# Patient Record
Sex: MALE | Race: WHITE | URBAN - METROPOLITAN AREA
[De-identification: names, ages, dates, MRNs, and addresses within clinical notes are randomized per-mention and may not be internally consistent; named-entity substitution may affect disease eponyms.]

---

## 2019-04-08 ENCOUNTER — IMPORTED ENCOUNTER (OUTPATIENT)
Dept: URBAN - METROPOLITAN AREA CLINIC 31 | Facility: CLINIC | Age: 84
End: 2019-04-08

## 2019-04-08 PROBLEM — H04.212: Noted: 2019-04-08

## 2019-04-08 PROBLEM — Z96.1: Noted: 2019-04-08

## 2019-04-08 PROCEDURE — 68840 EXPLORE/IRRIGATE TEAR DUCTS: CPT

## 2019-04-08 PROCEDURE — 99203 OFFICE O/P NEW LOW 30 MIN: CPT

## 2019-04-08 NOTE — PATIENT DISCUSSION
Return for an appointment in 1 week with Dr. Mihaela Pate at [facility]. Kenyetta Doll for [employer].

## 2019-04-08 NOTE — PATIENT DISCUSSION
1.  1.  Epiphora OS - The patient is complaining of excess lacrimation of the left eye. NASOLACRIMAL IRRIGATION - PATIENT. consider lateral cantal strip surgery due to lax lower lid letf side. Because it is relatively mild and not constant no diagnostic or therapeutic intervention was recommended at this time. 2. Probing Of Lacrimal Canaliculi Lower Lid3. Pseudophakia OU - IOLs stable. Monitor. 2. Return for an appointment in 1 week with Dr. Nguyen Askew at [facility]. Jackson ramirez [employer].

## 2019-04-22 ENCOUNTER — IMPORTED ENCOUNTER (OUTPATIENT)
Dept: URBAN - METROPOLITAN AREA CLINIC 31 | Facility: CLINIC | Age: 84
End: 2019-04-22

## 2019-04-22 PROBLEM — Z96.1: Noted: 2019-04-22

## 2019-04-22 PROBLEM — H04.212: Noted: 2019-04-22

## 2019-04-22 PROCEDURE — 99213 OFFICE O/P EST LOW 20 MIN: CPT

## 2019-04-22 NOTE — PATIENT DISCUSSION
1.  1.  Epiphora OS - The patient is complaining of excess lacrimation of the left eye. NASOLACRIMAL IRRIGATION - PATIENT. consider lateral cantal strip surgery due to lax lower lid letf side. Because it is relatively mild and not constant no diagnostic or therapeutic intervention was recommended at this time. 23.  Pseudophakia OU - IOLs stable. Monitor. 2. Return for an appointment in 1 week [place] Alden Sp[place] [facility] Arben Domínguez for [employer].

## 2019-04-22 NOTE — PATIENT DISCUSSION
Epiphora left>right - resolved. Increased lower eyelid laxityReturn for an appointment in 1 year for comprehensive exam. with Dr. Deirdre Santamaria.

## 2019-06-20 ENCOUNTER — IMPORTED ENCOUNTER (OUTPATIENT)
Dept: URBAN - METROPOLITAN AREA CLINIC 31 | Facility: CLINIC | Age: 84
End: 2019-06-20

## 2019-06-20 PROBLEM — H26.492: Noted: 2019-06-20

## 2019-06-20 PROBLEM — Z96.1: Noted: 2019-06-20

## 2019-06-20 PROCEDURE — 99080 SPECIAL REPORTS OR FORMS: CPT

## 2019-06-20 PROCEDURE — 92015 DETERMINE REFRACTIVE STATE: CPT

## 2019-06-20 PROCEDURE — 92014 COMPRE OPH EXAM EST PT 1/>: CPT

## 2019-06-20 NOTE — PATIENT DISCUSSION
PCO  OS (Posterior Capsule Opacification)   PCO is visually significant and impairment of vision does not meet the patient’s functional needs or interferes with activities of daily living. Risks benefits and alternatives to the Nd:YAG Laser reviewed including elevated IOP immediately postop and retinal tear/detachment. Patient to notify their ophthalmologist promptly if they have a significant change in symptoms such as flashes of light (photopsia) an increase in floaters loss of visual field or decrease in visual acuity after the procedure. Patient will be scheduled in [place] [id number] for Nd:YAG Laser.

## 2019-06-20 NOTE — PATIENT DISCUSSION
1.  Pseudophakia OU - IOLs stable. Monitor. 2. PCO  OS (Posterior Capsule Opacification)   PCO is visually significant and impairment of vision does not meet the patient’s functional needs or interferes with activities of daily living. 6 mos caps eval with Dr. Rosendo Hernnadez.

## 2019-11-01 ENCOUNTER — IMPORTED ENCOUNTER (OUTPATIENT)
Dept: URBAN - METROPOLITAN AREA CLINIC 31 | Facility: CLINIC | Age: 84
End: 2019-11-01

## 2019-11-01 PROBLEM — H26.491: Noted: 2019-11-01

## 2019-11-01 PROBLEM — H26.492: Noted: 2019-11-01

## 2019-11-01 PROBLEM — Z96.1: Noted: 2019-11-01

## 2019-11-01 PROBLEM — H26.493: Noted: 2019-11-01

## 2019-11-01 PROCEDURE — 99213 OFFICE O/P EST LOW 20 MIN: CPT

## 2019-11-01 NOTE — PATIENT DISCUSSION
1.  PCO  OS (Posterior Capsule Opacification)   PCO is visually significant and impairment of vision does not meet the patient’s functional needs or interferes with activities of daily living. Risks benefits and alternatives to the Nd:YAG Laser reviewed including elevated IOP immediately postop and retinal tear/detachment. Patient to notify their ophthalmologist promptly if they have a significant change in symptoms such as flashes of light (photopsia) an increase in floaters loss of visual field or decrease in visual acuity after the procedure. Patient will be scheduled in [place] [id number] for Nd:YAG Laser. Schedule yag caps OS2. Pseudophakia OU - IOLs stable. Monitor. 3. PCO  OD (Posterior Capsule Opacification)  Not visually significant at this time. Monitor for yag capsulotomy necessity. 4. Return for an appointment for Yag Capsulotomy. with Dr. Laura Nayak.

## 2019-11-01 NOTE — PATIENT DISCUSSION
PCO  OU (Posterior Capsule Opacification)   PCO is visually significant and impairment of vision does not meet the patient’s functional needs or interferes with activities of daily living. Risks benefits and alternatives to the Nd:YAG Laser reviewed including elevated IOP immediately postop and retinal tear/detachment. Patient to notify their ophthalmologist promptly if they have a significant change in symptoms such as flashes of light (photopsia) an increase in floaters loss of visual field or decrease in visual acuity after the procedure. Patient will be scheduled in [place] [id number] for Nd:YAG Laser.

## 2022-04-01 ASSESSMENT — TONOMETRY
OS_IOP_MMHG: 8
OD_IOP_MMHG: 9
OD_IOP_MMHG: 9
OS_IOP_MMHG: 9

## 2022-04-01 ASSESSMENT — VISUAL ACUITY
OD_CC: 20/30-2
OD_CC: 20/30
OS_PH: SC 20/40 -1
OS_PH: SC 20/30 -2
OD_GLARE: 20/200
OD_CC: 20/40+1
OS_CC: 20/50+2
OS_GLARE: 20/400
OS_CC: 20/50-2
OS_CC: 20/50-1
OD_SC: 20/25-3
OS_SC: 20/25-3
OD_CC: 20/40+2
OS_CC: 20/30-1